# Patient Record
Sex: FEMALE
[De-identification: names, ages, dates, MRNs, and addresses within clinical notes are randomized per-mention and may not be internally consistent; named-entity substitution may affect disease eponyms.]

---

## 2022-06-07 PROBLEM — Z00.00 ENCOUNTER FOR PREVENTIVE HEALTH EXAMINATION: Status: ACTIVE | Noted: 2022-06-07

## 2022-07-05 ENCOUNTER — FORM ENCOUNTER (OUTPATIENT)
Age: 47
End: 2022-07-05

## 2022-07-18 ENCOUNTER — APPOINTMENT (OUTPATIENT)
Dept: NEUROLOGY | Facility: CLINIC | Age: 47
End: 2022-07-18

## 2022-08-03 ENCOUNTER — APPOINTMENT (OUTPATIENT)
Dept: NEUROLOGY | Facility: CLINIC | Age: 47
End: 2022-08-03

## 2022-10-21 ENCOUNTER — APPOINTMENT (OUTPATIENT)
Dept: NEUROLOGY | Facility: CLINIC | Age: 47
End: 2022-10-21

## 2022-10-21 VITALS
TEMPERATURE: 98.3 F | OXYGEN SATURATION: 97 % | SYSTOLIC BLOOD PRESSURE: 112 MMHG | HEART RATE: 110 BPM | WEIGHT: 112 LBS | DIASTOLIC BLOOD PRESSURE: 79 MMHG | BODY MASS INDEX: 18 KG/M2 | HEIGHT: 66 IN

## 2022-10-21 DIAGNOSIS — R41.89 OTHER SYMPTOMS AND SIGNS INVOLVING COGNITIVE FUNCTIONS AND AWARENESS: ICD-10-CM

## 2022-10-21 PROCEDURE — 99205 OFFICE O/P NEW HI 60 MIN: CPT

## 2022-10-23 NOTE — DATA REVIEWED
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 Prominence of the sulci over B/L convexities compatible with parenchymal volume loss. There is a stable partially empty sella turcica. B/L temporal lobe cortical brain region volumes are decreased. [de-identified] : Serum: NMO, MOG, CBC, LFT, CK, Ferritin, SJOGREN, Lipids, GM1, GM2, GD1B, GQIB, IgG, IRON, VEGF, IL6, JENNIFER, MMP-9, NMDA, LGI, SANDRO, CASPR-2, MAG, TTA, VGCC, ANCA, RF, CORAL, EBV, HSV 1& 2, Copper: 55 (), Alpha-Galactosidase: 11.6 (>35.5)\par \par US pelvis: 02/2022: [id number] anterior myometrial fibroid. SImple nabothian cervical cysts. No teratoma detected.

## 2022-10-23 NOTE — DISCUSSION/SUMMARY
[FreeTextEntry1] : Patient with symptoms of cognitive decline in the form of memory, attention, concentration. Neuropsychiatric symptoms in the form of anxiety, poor sleep (likely due to symptoms), headaches described as starting deep in the throat behind the palate shooting to the left>right of the head, left neck, left chest and below the breast, neck pain radiating to both arms and generalize electric shock/stabbing like pain. Symptoms are not positional and began about 3 years ago then began progressing about 2 years ago. \par \par Had extensive work up to include MRI brain, serology and LP ( LP not provided from 01/2022 but was reported negative with opening pressure of 16). Of note, after LP patient required blood patch. Patient reports having brain PET that showed hypometabolism. Patient sent records ahead of visit that were reviewed but also presented with a file of voluminous records at the time of the visit. Will review records provided today, specifically LP, Brain PET and NPT reports. \par \par Prior to making treatment recommendation will need to complete or repeat testing as highlighted to rule out insidious onset of neuro inflammatory process. \par \par At this time we recommend the following to be done by treating physician in [place]:\par 1. Repeat Brain PET scan if done more then a year ago to compare to previous one given reported hypoperfusion. \par 2. Neuro -psych testing to assess frontal executive functioning and working memory. Must be repeated if initial one done more then a year ago. \par 3. Serology as ordered\par 4. 48 AEEG \par 5. Sleep needs to be addressed as can be adding to current symptoms. \par \par All recommendations will be sent to local treating physician in [place]. \par \par Extensive records and imaging were reviewed in preparation of today's visit. 60 minutes were spent obtaining patient history, examination and discussion of diagnoses and plan. All questions and concerns were addressed to the best of my ability. Emotional support was rendered.

## 2022-10-23 NOTE — PHYSICAL EXAM
[FreeTextEntry1] : MOCA: 28 /30\par \par The patient exhibited fluent speech, intact memory and language functions. The remainder of the cranial nerve exam is entirely normal. Patient appeared anxious throughout the visit. \par \par CRANIAL NERVE EXAM: EOMI, No nystagmus. No visual field cut noted. B/L optic discs clear, no APD, no papilledema. PERRLA\par \par Tongue and Uvula at midline. +gag. \par \par MOTOR EXAM: 5/5 throughout \par \par Fine motor: intact bilaterally. GIULIANA: Intact bilaterally.\par \par No pronator drift\par No Dysmetria on F-N or H-S\par \par \par REFLEXES: 2/5 throughout with reinforcement it the legs \par                    Toes: downgoing bilaterally \par \par SENSATION: intact to all modalities to include PP, ST, vibration and proprioception\par \par GAIT: normal gait. normal foot tapping. Romberg negative

## 2022-10-23 NOTE — HISTORY OF PRESENT ILLNESS
[FreeTextEntry1] : Ms. BARRERA presents today for an initial consultation for a constellation of symptoms. 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 Feels in retrospect symptoms may of began slowly in 2019 but recalls exacerbation of symptoms in 2020. \par \par Patient reports developing progressive symptoms of headaches described as starting deep in the throat behind the palate shooting to the left>right of the head, left neck, left chest and below the breast. Also reporting neck pain radiating to both arms and generalized electric shock/stabbing like pain. Further symptoms include twitching of the face, jerking of the body, left ear chirping noise, electric shock like pain throughout the body, cognitive decline and feeling a chemical-like feeling in her head. \par \par Patient reports symptoms are progressive, daily and do not change with positions. Since symptoms began patient reports that she has been unable to work or function and spends most of her time in bed. Seen by many different doctors with unclear diagnoses to include [facility] in [place]. Had extensive treatment trials to include IVIG, Nortriptyline, Klonopin, PLEX (developed blood clots), Gabapentin, Cymbalta, Trazodone, Seroquel, Lyrica, Ubrevely (see extensive list in chart).

## 2022-10-24 ENCOUNTER — NON-APPOINTMENT (OUTPATIENT)
Age: 47
End: 2022-10-24

## 2022-11-06 ENCOUNTER — FORM ENCOUNTER (OUTPATIENT)
Age: 47
End: 2022-11-06

## 2022-11-07 ENCOUNTER — FORM ENCOUNTER (OUTPATIENT)
Age: 47
End: 2022-11-07

## 2022-11-14 ENCOUNTER — NON-APPOINTMENT (OUTPATIENT)
Age: 47
End: 2022-11-14

## 2022-11-22 ENCOUNTER — NON-APPOINTMENT (OUTPATIENT)
Age: 47
End: 2022-11-22

## 2022-11-22 ENCOUNTER — APPOINTMENT (OUTPATIENT)
Dept: NEUROLOGY | Facility: CLINIC | Age: 47
End: 2022-11-22

## 2022-11-30 ENCOUNTER — NON-APPOINTMENT (OUTPATIENT)
Age: 47
End: 2022-11-30

## 2022-12-04 ENCOUNTER — FORM ENCOUNTER (OUTPATIENT)
Age: 47
End: 2022-12-04

## 2022-12-14 ENCOUNTER — NON-APPOINTMENT (OUTPATIENT)
Age: 47
End: 2022-12-14